# Patient Record
Sex: FEMALE | Race: WHITE
[De-identification: names, ages, dates, MRNs, and addresses within clinical notes are randomized per-mention and may not be internally consistent; named-entity substitution may affect disease eponyms.]

---

## 2020-07-29 PROBLEM — Z00.00 ENCOUNTER FOR PREVENTIVE HEALTH EXAMINATION: Status: ACTIVE | Noted: 2020-07-29

## 2020-08-05 ENCOUNTER — APPOINTMENT (OUTPATIENT)
Dept: SURGERY | Facility: CLINIC | Age: 75
End: 2020-08-05
Payer: MEDICARE

## 2020-08-05 VITALS
DIASTOLIC BLOOD PRESSURE: 72 MMHG | TEMPERATURE: 98.1 F | HEIGHT: 64 IN | WEIGHT: 160 LBS | SYSTOLIC BLOOD PRESSURE: 126 MMHG | BODY MASS INDEX: 27.31 KG/M2 | HEART RATE: 75 BPM

## 2020-08-05 DIAGNOSIS — E78.00 PURE HYPERCHOLESTEROLEMIA, UNSPECIFIED: ICD-10-CM

## 2020-08-05 DIAGNOSIS — I10 ESSENTIAL (PRIMARY) HYPERTENSION: ICD-10-CM

## 2020-08-05 DIAGNOSIS — K80.20 CALCULUS OF GALLBLADDER W/OUT CHOLECYSTITIS W/OUT OBSTRUCTION: ICD-10-CM

## 2020-08-05 PROCEDURE — 99203 OFFICE O/P NEW LOW 30 MIN: CPT

## 2020-08-05 RX ORDER — AMLODIPINE AND ATORVASTATIN 2.5; 4 MG/1; MG/1
TABLET, COATED ORAL
Refills: 0 | Status: ACTIVE | COMMUNITY

## 2020-08-05 RX ORDER — FLUOXETINE HCL 10 MG
10 TABLET ORAL
Refills: 0 | Status: ACTIVE | COMMUNITY

## 2020-08-05 RX ORDER — ATORVASTATIN CALCIUM 20 MG/1
20 TABLET, FILM COATED ORAL
Refills: 0 | Status: ACTIVE | COMMUNITY

## 2020-08-05 RX ORDER — UBIDECARENONE/VIT E ACET 100MG-5
CAPSULE ORAL
Refills: 0 | Status: ACTIVE | COMMUNITY

## 2020-08-10 NOTE — ASSESSMENT
[FreeTextEntry1] : 76yo female with cholelithiasis.\par -clinical presentation and evaluation not concerning for biliary colic\par -recommend EGD - gave referral for GI \par -discussed possible HIDA scan if symptoms persist\par -return to office PRN - recommend coming to ER if develops fever/chills, nausea/vomiting

## 2020-08-10 NOTE — PHYSICAL EXAM
[Normal Breath Sounds] : Normal breath sounds [Normal Heart Sounds] : normal heart sounds [Alert] : alert [Calm] : calm [de-identified] : no acute distress [de-identified] : full ROM x 4 [de-identified] : no CVA tenderness B/L [de-identified] : point tenderness along right lateral costal margin on rib cage, no swelling, no masses, no abdominal tenderness, RLQ scar, no rebound/guarding, no hernia

## 2020-08-10 NOTE — CONSULT LETTER
[Courtesy Letter:] : I had the pleasure of seeing your patient, [unfilled], in my office today. [Please see my note below.] : Please see my note below. [Sincerely,] : Sincerely, [Dear  ___] : Dear  [unfilled], [FreeTextEntry3] : Shalini Santoyo MD\par Bariatric & Minimally Invasive Surgery\par [facility]\par [phone]

## 2020-08-10 NOTE — DATA REVIEWED
[FreeTextEntry1] : US abdomen complete without doppler 7/10/2020 - single mobile 5mm gallstone, no evidence of acute cholecystitis

## 2020-08-10 NOTE — HISTORY OF PRESENT ILLNESS
[de-identified] : 76yo female with PMHx of HTN, HLD referred for evaluation of gallstones. Patient reports intermittent right sided pain along her ribcage x several months. She also describes increased belching recently. Occasional nausea if she is hungry, otherwise denies vomiting, fever/chills, chest pain or shortness of breath. She feels her symptoms started during the pandemic when she started eating poorly. Although, she does not think the pain is associated with eating meals, but rather feels it is "random." She underwent abdominal US that demonstrated a single gallstone. Denies urinary symptoms or issues with bowel function.

## 2021-04-29 ENCOUNTER — OUTPATIENT (OUTPATIENT)
Dept: OUTPATIENT SERVICES | Facility: HOSPITAL | Age: 76
LOS: 1 days | Discharge: HOME | End: 2021-04-29
Payer: MEDICARE

## 2021-04-29 DIAGNOSIS — Z12.31 ENCOUNTER FOR SCREENING MAMMOGRAM FOR MALIGNANT NEOPLASM OF BREAST: ICD-10-CM

## 2021-04-29 PROCEDURE — 77067 SCR MAMMO BI INCL CAD: CPT | Mod: 26

## 2021-04-29 PROCEDURE — 77063 BREAST TOMOSYNTHESIS BI: CPT | Mod: 26

## 2021-04-30 DIAGNOSIS — Z87.310 PERSONAL HISTORY OF (HEALED) OSTEOPOROSIS FRACTURE: ICD-10-CM

## 2021-04-30 DIAGNOSIS — Z78.0 ASYMPTOMATIC MENOPAUSAL STATE: ICD-10-CM

## 2021-04-30 DIAGNOSIS — M81.0 AGE-RELATED OSTEOPOROSIS WITHOUT CURRENT PATHOLOGICAL FRACTURE: ICD-10-CM

## 2021-04-30 DIAGNOSIS — Z13.820 ENCOUNTER FOR SCREENING FOR OSTEOPOROSIS: ICD-10-CM

## 2022-08-15 ENCOUNTER — APPOINTMENT (OUTPATIENT)
Dept: ORTHOPEDIC SURGERY | Facility: CLINIC | Age: 77
End: 2022-08-15

## 2022-08-15 VITALS — BODY MASS INDEX: 27.31 KG/M2 | HEIGHT: 64 IN | WEIGHT: 160 LBS

## 2022-08-15 DIAGNOSIS — S52.592A OTHER FRACTURES OF LOWER END OF LEFT RADIUS, INITIAL ENCOUNTER FOR CLOSED FRACTURE: ICD-10-CM

## 2022-08-15 PROCEDURE — 29125 APPL SHORT ARM SPLINT STATIC: CPT | Mod: LT

## 2022-08-15 PROCEDURE — 73130 X-RAY EXAM OF HAND: CPT | Mod: LT

## 2022-08-15 PROCEDURE — 73110 X-RAY EXAM OF WRIST: CPT | Mod: LT

## 2022-08-15 PROCEDURE — 99203 OFFICE O/P NEW LOW 30 MIN: CPT | Mod: 25

## 2022-08-15 RX ORDER — TRAMADOL HYDROCHLORIDE 50 MG/1
50 TABLET, COATED ORAL
Qty: 6 | Refills: 0 | Status: ACTIVE | COMMUNITY
Start: 2022-08-15 | End: 1900-01-01

## 2022-08-15 NOTE — HISTORY OF PRESENT ILLNESS
[de-identified] : A 69-year-old female here for left hand/ wrist pain.  Patient states on 08/13/2022 she tripped and fell in her bathroom and fell on outstretched left hand /wrist.  Patient states she was in pain but waited a couple days to see if pain went away.  Patient states pain has continued and patient has mild decreased range of motion.  Denies numbness and tingling.

## 2022-08-15 NOTE — PHYSICAL EXAM
[Left] : left hand [Distal Radius] : distal radius [Ulna Styloid] : ulna styloid [Anatomic Snuff Box] : anatomic snuff box [] : no instability w/varus/valgus or ant/post stressing of fingers joints [FreeTextEntry3] :   Mild ecchymosis radial aspect wrist [FreeTextEntry9] :  mild decreased range of motion secondary to pain [de-identified] :  good strength, pain with strength testing

## 2022-08-15 NOTE — HISTORY OF PRESENT ILLNESS
[de-identified] : A 69-year-old female here for left hand/ wrist pain.  Patient states on 08/13/2022 she tripped and fell in her bathroom and fell on outstretched left hand /wrist.  Patient states she was in pain but waited a couple days to see if pain went away.  Patient states pain has continued and patient has mild decreased range of motion.  Denies numbness and tingling.

## 2022-08-15 NOTE — PHYSICAL EXAM
[Left] : left hand [Distal Radius] : distal radius [Ulna Styloid] : ulna styloid [Anatomic Snuff Box] : anatomic snuff box [] : no instability w/varus/valgus or ant/post stressing of fingers joints [FreeTextEntry3] :   Mild ecchymosis radial aspect wrist [FreeTextEntry9] :  mild decreased range of motion secondary to pain [de-identified] :  good strength, pain with strength testing

## 2022-08-15 NOTE — DATA REVIEWED
[Left] : left [Wrist] : wrist [Hand] : hand [FreeTextEntry1] : X-Ray: Volarly displaced distal radius fracture.  ulnar styloid fracture.

## 2022-08-15 NOTE — DISCUSSION/SUMMARY
[de-identified] : Discussed x-rays with patient showing displaced distal radius fracture.  Discussed with patient that this fracture is unstable.  Dr. Roberts  came into the room, we discussed surgery with patient and possible risks and indications of surgery.  Patient agreed to surgery.  Tramadol 50 mg b.i.d. sent to patient's pharmacy for pain relief, discussed side effects.Advised pt to discuss w primary physician before taking.  Patient will be called by surgical booking.  Call if any questions or concerns.  Patient understands agrees with plan. Seen under supervision of Dr. Roberts.

## 2022-08-15 NOTE — DISCUSSION/SUMMARY
[de-identified] : Discussed x-rays with patient showing displaced distal radius fracture.  Discussed with patient that this fracture is unstable.  Dr. Roberts  came into the room, we discussed surgery with patient and possible risks and indications of surgery.  Patient agreed to surgery.  Tramadol 50 mg b.i.d. sent to patient's pharmacy for pain relief, discussed side effects.Advised pt to discuss w primary physician before taking.  Patient will be called by surgical booking.  Call if any questions or concerns.  Patient understands agrees with plan. Seen under supervision of Dr. Roberts.

## 2022-08-17 ENCOUNTER — OUTPATIENT (OUTPATIENT)
Dept: OUTPATIENT SERVICES | Facility: HOSPITAL | Age: 77
LOS: 1 days | Discharge: HOME | End: 2022-08-17

## 2022-08-17 ENCOUNTER — RESULT REVIEW (OUTPATIENT)
Age: 77
End: 2022-08-17

## 2022-08-17 VITALS
HEART RATE: 70 BPM | DIASTOLIC BLOOD PRESSURE: 70 MMHG | SYSTOLIC BLOOD PRESSURE: 124 MMHG | RESPIRATION RATE: 16 BRPM | WEIGHT: 158.07 LBS | HEIGHT: 64 IN | TEMPERATURE: 98 F | OXYGEN SATURATION: 98 %

## 2022-08-17 DIAGNOSIS — S52.572D OTHER INTRAARTICULAR FRACTURE OF LOWER END OF LEFT RADIUS, SUBSEQUENT ENCOUNTER FOR CLOSED FRACTURE WITH ROUTINE HEALING: ICD-10-CM

## 2022-08-17 DIAGNOSIS — Z98.890 OTHER SPECIFIED POSTPROCEDURAL STATES: Chronic | ICD-10-CM

## 2022-08-17 DIAGNOSIS — Z01.818 ENCOUNTER FOR OTHER PREPROCEDURAL EXAMINATION: ICD-10-CM

## 2022-08-17 LAB
ALBUMIN SERPL ELPH-MCNC: 4.4 G/DL — SIGNIFICANT CHANGE UP (ref 3.5–5.2)
ALP SERPL-CCNC: 120 U/L — HIGH (ref 30–115)
ALT FLD-CCNC: 13 U/L — SIGNIFICANT CHANGE UP (ref 0–41)
ANION GAP SERPL CALC-SCNC: 11 MMOL/L — SIGNIFICANT CHANGE UP (ref 7–14)
APPEARANCE UR: CLEAR — SIGNIFICANT CHANGE UP
AST SERPL-CCNC: 16 U/L — SIGNIFICANT CHANGE UP (ref 0–41)
BACTERIA # UR AUTO: NEGATIVE — SIGNIFICANT CHANGE UP
BASOPHILS # BLD AUTO: 0.03 K/UL — SIGNIFICANT CHANGE UP (ref 0–0.2)
BASOPHILS NFR BLD AUTO: 0.5 % — SIGNIFICANT CHANGE UP (ref 0–1)
BILIRUB SERPL-MCNC: 0.4 MG/DL — SIGNIFICANT CHANGE UP (ref 0.2–1.2)
BILIRUB UR-MCNC: NEGATIVE — SIGNIFICANT CHANGE UP
BUN SERPL-MCNC: 21 MG/DL — HIGH (ref 10–20)
CALCIUM SERPL-MCNC: 9.7 MG/DL — SIGNIFICANT CHANGE UP (ref 8.5–10.1)
CHLORIDE SERPL-SCNC: 102 MMOL/L — SIGNIFICANT CHANGE UP (ref 98–110)
CO2 SERPL-SCNC: 24 MMOL/L — SIGNIFICANT CHANGE UP (ref 17–32)
COLOR SPEC: YELLOW — SIGNIFICANT CHANGE UP
CREAT SERPL-MCNC: 0.7 MG/DL — SIGNIFICANT CHANGE UP (ref 0.7–1.5)
DIFF PNL FLD: ABNORMAL
EGFR: 88 ML/MIN/1.73M2 — SIGNIFICANT CHANGE UP
EOSINOPHIL # BLD AUTO: 0.25 K/UL — SIGNIFICANT CHANGE UP (ref 0–0.7)
EOSINOPHIL NFR BLD AUTO: 3.8 % — SIGNIFICANT CHANGE UP (ref 0–8)
EPI CELLS # UR: 7 /HPF — HIGH (ref 0–5)
GLUCOSE SERPL-MCNC: 77 MG/DL — SIGNIFICANT CHANGE UP (ref 70–99)
GLUCOSE UR QL: NEGATIVE — SIGNIFICANT CHANGE UP
HCT VFR BLD CALC: 42.8 % — SIGNIFICANT CHANGE UP (ref 37–47)
HGB BLD-MCNC: 14 G/DL — SIGNIFICANT CHANGE UP (ref 12–16)
HYALINE CASTS # UR AUTO: 1 /LPF — SIGNIFICANT CHANGE UP (ref 0–7)
IMM GRANULOCYTES NFR BLD AUTO: 0.3 % — SIGNIFICANT CHANGE UP (ref 0.1–0.3)
KETONES UR-MCNC: NEGATIVE — SIGNIFICANT CHANGE UP
LEUKOCYTE ESTERASE UR-ACNC: NEGATIVE — SIGNIFICANT CHANGE UP
LYMPHOCYTES # BLD AUTO: 1.03 K/UL — LOW (ref 1.2–3.4)
LYMPHOCYTES # BLD AUTO: 15.7 % — LOW (ref 20.5–51.1)
MCHC RBC-ENTMCNC: 31.7 PG — HIGH (ref 27–31)
MCHC RBC-ENTMCNC: 32.7 G/DL — SIGNIFICANT CHANGE UP (ref 32–37)
MCV RBC AUTO: 97.1 FL — SIGNIFICANT CHANGE UP (ref 81–99)
MONOCYTES # BLD AUTO: 0.59 K/UL — SIGNIFICANT CHANGE UP (ref 0.1–0.6)
MONOCYTES NFR BLD AUTO: 9 % — SIGNIFICANT CHANGE UP (ref 1.7–9.3)
NEUTROPHILS # BLD AUTO: 4.62 K/UL — SIGNIFICANT CHANGE UP (ref 1.4–6.5)
NEUTROPHILS NFR BLD AUTO: 70.7 % — SIGNIFICANT CHANGE UP (ref 42.2–75.2)
NITRITE UR-MCNC: NEGATIVE — SIGNIFICANT CHANGE UP
NRBC # BLD: 0 /100 WBCS — SIGNIFICANT CHANGE UP (ref 0–0)
PH UR: 6.5 — SIGNIFICANT CHANGE UP (ref 5–8)
PLATELET # BLD AUTO: 221 K/UL — SIGNIFICANT CHANGE UP (ref 130–400)
POTASSIUM SERPL-MCNC: 4.1 MMOL/L — SIGNIFICANT CHANGE UP (ref 3.5–5)
POTASSIUM SERPL-SCNC: 4.1 MMOL/L — SIGNIFICANT CHANGE UP (ref 3.5–5)
PROT SERPL-MCNC: 7.1 G/DL — SIGNIFICANT CHANGE UP (ref 6–8)
PROT UR-MCNC: ABNORMAL
RBC # BLD: 4.41 M/UL — SIGNIFICANT CHANGE UP (ref 4.2–5.4)
RBC # FLD: 12.7 % — SIGNIFICANT CHANGE UP (ref 11.5–14.5)
RBC CASTS # UR COMP ASSIST: 1 /HPF — SIGNIFICANT CHANGE UP (ref 0–4)
SODIUM SERPL-SCNC: 137 MMOL/L — SIGNIFICANT CHANGE UP (ref 135–146)
SP GR SPEC: 1.02 — SIGNIFICANT CHANGE UP (ref 1.01–1.03)
UROBILINOGEN FLD QL: SIGNIFICANT CHANGE UP
WBC # BLD: 6.54 K/UL — SIGNIFICANT CHANGE UP (ref 4.8–10.8)
WBC # FLD AUTO: 6.54 K/UL — SIGNIFICANT CHANGE UP (ref 4.8–10.8)
WBC UR QL: 2 /HPF — SIGNIFICANT CHANGE UP (ref 0–5)

## 2022-08-17 PROCEDURE — 93010 ELECTROCARDIOGRAM REPORT: CPT

## 2022-08-17 PROCEDURE — 71046 X-RAY EXAM CHEST 2 VIEWS: CPT | Mod: 26

## 2022-08-17 NOTE — H&P PST ADULT - NSICDXPASTMEDICALHX_GEN_ALL_CORE_FT
PAST MEDICAL HISTORY:  Depression Denies suicidal ideas    GERD (gastroesophageal reflux disease)     HTN (hypertension)     Hypercholesteremia

## 2022-08-17 NOTE — H&P PST ADULT - NSICDXPASTSURGICALHX_GEN_ALL_CORE_FT
PAST SURGICAL HISTORY:  S/P ORIF (open reduction internal fixation) fracture RT hip  hysterectomy  cataract surgery-B/L

## 2022-08-17 NOTE — H&P PST ADULT - HISTORY OF PRESENT ILLNESS
77yr old female states tripped and fell at home and fractured her LT wrist on 8/13/2022 is scheduled for OPEN REDUCTION INTERNAL FIXATION LEFT WRIST FRACTURE. Denies COVID S/S. Recd 3 doses vaccine. Verbalized understanding of COVID prevention measures. Exercise zeyad 1 flat block slowly with cane LTD by pain knees and balance issues.  Anesthesia Alert  YES--Difficult Airway  NO--History of neck surgery or radiation  NO--Limited ROM of neck  NO--History of Malignant hyperthermia  NO--Personal or family history of Pseudocholinesterase deficiency  YES--Prior Anesthesia Complication-Hypotension after colonoscopy  NO--Latex Allergy  NO--Loose teeth  NO--History of Rheumatoid Arthritis  NO--PHAM  No Bleeding risk  NO--Other_____

## 2022-08-17 NOTE — H&P PST ADULT - NSANTHOSAYNRD_GEN_A_CORE
No. PHAM screening performed.  STOP BANG Legend: 0-2 = LOW Risk; 3-4 = INTERMEDIATE Risk; 5-8 = HIGH Risk

## 2022-08-17 NOTE — H&P PST ADULT - ADDITIONAL PE
LT arm  in cast- - mild swelling of the fingers with ecchymosis of the thumb-brisk  cap refill, warm, able to move fingers , sensation intact. Gait slow with cane -needs assist with transfers.

## 2022-08-17 NOTE — H&P PST ADULT - MUSCULOSKELETAL
normal/normal gait/strength 5/5 bilateral upper extremities/strength 5/5 bilateral lower extremities negative

## 2022-08-18 ENCOUNTER — APPOINTMENT (OUTPATIENT)
Dept: ORTHOPEDIC SURGERY | Facility: CLINIC | Age: 77
End: 2022-08-18

## 2022-08-18 VITALS — WEIGHT: 158 LBS | BODY MASS INDEX: 26.98 KG/M2 | HEIGHT: 64 IN

## 2022-08-18 DIAGNOSIS — M25.571 PAIN IN RIGHT ANKLE AND JOINTS OF RIGHT FOOT: ICD-10-CM

## 2022-08-18 DIAGNOSIS — M17.11 UNILATERAL PRIMARY OSTEOARTHRITIS, RIGHT KNEE: ICD-10-CM

## 2022-08-18 DIAGNOSIS — M25.551 PAIN IN RIGHT HIP: ICD-10-CM

## 2022-08-18 PROBLEM — I10 ESSENTIAL (PRIMARY) HYPERTENSION: Chronic | Status: ACTIVE | Noted: 2022-08-17

## 2022-08-18 PROBLEM — K21.9 GASTRO-ESOPHAGEAL REFLUX DISEASE WITHOUT ESOPHAGITIS: Chronic | Status: ACTIVE | Noted: 2022-08-17

## 2022-08-18 PROBLEM — F32.A DEPRESSION, UNSPECIFIED: Chronic | Status: ACTIVE | Noted: 2022-08-17

## 2022-08-18 PROBLEM — E78.00 PURE HYPERCHOLESTEROLEMIA, UNSPECIFIED: Chronic | Status: ACTIVE | Noted: 2022-08-17

## 2022-08-18 PROCEDURE — 20610 DRAIN/INJ JOINT/BURSA W/O US: CPT | Mod: RT

## 2022-08-18 PROCEDURE — 73630 X-RAY EXAM OF FOOT: CPT | Mod: RT

## 2022-08-18 PROCEDURE — 73502 X-RAY EXAM HIP UNI 2-3 VIEWS: CPT | Mod: RT

## 2022-08-18 PROCEDURE — 73562 X-RAY EXAM OF KNEE 3: CPT | Mod: RT

## 2022-08-18 PROCEDURE — 73610 X-RAY EXAM OF ANKLE: CPT | Mod: RT

## 2022-08-18 PROCEDURE — 99203 OFFICE O/P NEW LOW 30 MIN: CPT | Mod: 25

## 2022-08-18 NOTE — PROCEDURE
[Large Joint Injection] : Large joint injection [Right] : of the right [Knee] : knee [Pain] : pain [X-ray evidence of Osteoarthritis on this or prior visit] : x-ray evidence of Osteoarthritis on this or prior visit [Alcohol] : alcohol [Betadine] : betadine [Ethyl Chloride sprayed topically] : ethyl chloride sprayed topically [Sterile technique used] : sterile technique used [___ cc    1%] : Lidocaine ~Vcc of 1%  [___ cc    4mg] : Dexamethasone (Decadron) ~Vcc of 4 mg  [] : Patient tolerated procedure well

## 2022-08-21 ENCOUNTER — LABORATORY RESULT (OUTPATIENT)
Age: 77
End: 2022-08-21

## 2022-08-23 NOTE — ASU PATIENT PROFILE, ADULT - FALL HARM RISK - HARM RISK INTERVENTIONS

## 2022-08-24 ENCOUNTER — TRANSCRIPTION ENCOUNTER (OUTPATIENT)
Age: 77
End: 2022-08-24

## 2022-08-24 ENCOUNTER — APPOINTMENT (OUTPATIENT)
Age: 77
End: 2022-08-24

## 2022-08-24 ENCOUNTER — OUTPATIENT (OUTPATIENT)
Dept: OUTPATIENT SERVICES | Facility: HOSPITAL | Age: 77
LOS: 1 days | Discharge: HOME | End: 2022-08-24

## 2022-08-24 VITALS
RESPIRATION RATE: 18 BRPM | SYSTOLIC BLOOD PRESSURE: 134 MMHG | DIASTOLIC BLOOD PRESSURE: 68 MMHG | OXYGEN SATURATION: 98 % | HEIGHT: 64 IN | HEART RATE: 70 BPM | TEMPERATURE: 99 F | WEIGHT: 158.07 LBS

## 2022-08-24 VITALS
RESPIRATION RATE: 19 BRPM | DIASTOLIC BLOOD PRESSURE: 68 MMHG | OXYGEN SATURATION: 98 % | HEART RATE: 71 BPM | SYSTOLIC BLOOD PRESSURE: 147 MMHG

## 2022-08-24 DIAGNOSIS — Z98.890 OTHER SPECIFIED POSTPROCEDURAL STATES: Chronic | ICD-10-CM

## 2022-08-24 PROCEDURE — 25609 OPTX DST RD XART FX/EP SEP3+: CPT | Mod: LT

## 2022-08-24 RX ORDER — SODIUM CHLORIDE 9 MG/ML
1000 INJECTION, SOLUTION INTRAVENOUS
Refills: 0 | Status: DISCONTINUED | OUTPATIENT
Start: 2022-08-24 | End: 2022-09-07

## 2022-08-24 RX ORDER — AMLODIPINE BESYLATE 2.5 MG/1
1 TABLET ORAL
Qty: 0 | Refills: 0 | DISCHARGE

## 2022-08-24 RX ORDER — ONDANSETRON 8 MG/1
4 TABLET, FILM COATED ORAL ONCE
Refills: 0 | Status: DISCONTINUED | OUTPATIENT
Start: 2022-08-24 | End: 2022-09-07

## 2022-08-24 RX ORDER — ATORVASTATIN CALCIUM 80 MG/1
1 TABLET, FILM COATED ORAL
Qty: 0 | Refills: 0 | DISCHARGE

## 2022-08-24 RX ORDER — TRAMADOL HYDROCHLORIDE 50 MG/1
1 TABLET ORAL
Qty: 0 | Refills: 0 | DISCHARGE

## 2022-08-24 RX ORDER — OMEPRAZOLE 10 MG/1
1 CAPSULE, DELAYED RELEASE ORAL
Qty: 0 | Refills: 0 | DISCHARGE

## 2022-08-24 RX ORDER — OXYCODONE AND ACETAMINOPHEN 5; 325 MG/1; MG/1
2 TABLET ORAL EVERY 6 HOURS
Refills: 0 | Status: DISCONTINUED | OUTPATIENT
Start: 2022-08-24 | End: 2022-08-24

## 2022-08-24 RX ORDER — FLUOXETINE HCL 10 MG
1 CAPSULE ORAL
Qty: 0 | Refills: 0 | DISCHARGE

## 2022-08-24 RX ORDER — IBUPROFEN 200 MG
1 TABLET ORAL
Qty: 20 | Refills: 0
Start: 2022-08-24

## 2022-08-24 RX ADMIN — SODIUM CHLORIDE 100 MILLILITER(S): 9 INJECTION, SOLUTION INTRAVENOUS at 16:06

## 2022-08-24 NOTE — ASU DISCHARGE PLAN (ADULT/PEDIATRIC) - ASU DC SPECIAL INSTRUCTIONSFT
DIET:    Resume normal diet  No alcoholic  beverages for 24 hours or if on prescribed narcotic pain medications.    MEDICATION:    Resume your preoperative oral medications.  Check with your physician before starting aspirin, Coumadin, or other blood thinners.  Prescriptions given to you - take as directed.      ACTIVITY:    Rest today and limit your activities for 24 hours.  Do not drive or operate machinery for 24 hours - if you received anesthesia.  When taking pain medication, be careful as you walk or climb stairs.  It is not advisable to drive while taking prescribed pain medication.    SPECIAL INSTRUCTIONS:    __x___ Elevate operative site above heart level or as directed.  __x___ Apply ice to operative site as directed.  __x___ Use  sling as directed.  __x___ Exercise fingers.    DRESSING CARE:    _____ You may change the dressing 4 days. Keep wound covered with band-aids.  __x___ Do not change the dressing until your doctor see you.  _____ You can loosen or rewrap the dressing.  _____  Keep dressing clean and dry.  _____ You may shower in _____ day(s) with the extremity covered by a plastic bag.  _____ OK to wash hand , including showers, in _____ day(s).    ADDITIONAL  INFORMATION:    Post operative visit should be scheduled for next week.  If you are not aware of visit please contact office.  If you have any questions or concerns call office at      Notify your doctor if you develop   Fever  Excessive Swelling  Chills   Drainage   Pain not controlled by medication  Persistent numbness in hand or fingers    If an Emergency arises call 911 and/or go to the Emergency Room

## 2022-08-24 NOTE — BRIEF OPERATIVE NOTE - NSICDXBRIEFPOSTOP_GEN_ALL_CORE_FT
POST-OP DIAGNOSIS:  Other intra-articular fracture of distal end of left radius 24-Aug-2022 14:21:15  Haresh Roberts

## 2022-08-24 NOTE — ASU DISCHARGE PLAN (ADULT/PEDIATRIC) - CARE PROVIDER_API CALL
Haresh Roberts)  Orthopaedic Surgery  [address] [place] [address]  Phone: [phone]  Fax: [phone]  Follow Up Time:

## 2022-08-24 NOTE — BRIEF OPERATIVE NOTE - NSICDXBRIEFPROCEDURE_GEN_ALL_CORE_FT
PROCEDURES:  ORIF, 3-part fracture, radius, distal, intra-articular 24-Aug-2022 14:20:48  Haresh Roberts

## 2022-08-24 NOTE — BRIEF OPERATIVE NOTE - NSICDXBRIEFPREOP_GEN_ALL_CORE_FT
PRE-OP DIAGNOSIS:  Other intra-articular fracture of distal end of left radius 24-Aug-2022 14:21:02  Haresh Roberts

## 2022-08-24 NOTE — CHART NOTE - NSCHARTNOTEFT_GEN_A_CORE
PACU ANESTHESIA ADMISSION NOTE      Procedure: ORIF, 3-part fracture, radius, distal, intra-articular      Post op diagnosis:  Other intra-articular fracture of distal end of left radius        ____  Intubated  TV:______       Rate: ______      FiO2: ______    __x__  Patent Airway    __x__  Full return of protective reflexes    __x__  Full recovery from anesthesia / back to baseline status    Vitals  HR: 65  BP: 140/65  RR: 18  O2 Sat: 98%  Temp: 97.6    Mental Status:  __x__ Awake   ___x__ Alert   _____ Drowsy   _____ Sedated    Nausea/Vomiting:  __x__ NO  ______Yes,   See Post - Op Orders          Pain Scale (0-10):  _____    Treatment: ____ None    __x__ See Post - Op/PCA Orders    Post - Operative Fluids:   ____ Oral   __x__ See Post - Op Orders    Plan: Discharge when criteria met:   _x___Home       _____Floor     _____Critical Care   Other:_________________    Comments: Patient had smooth intraoperative event, no anesthesia complication.

## 2022-08-24 NOTE — ASU DISCHARGE PLAN (ADULT/PEDIATRIC) - NS MD DC FALL RISK RISK
For information on Fall & Injury Prevention, visit: [email or web address] OR  [email or web address] OR  https://www.cdc.gov/steadi/patient.html
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
## 2022-08-26 DIAGNOSIS — S52.572A OTHER INTRAARTICULAR FRACTURE OF LOWER END OF LEFT RADIUS, INITIAL ENCOUNTER FOR CLOSED FRACTURE: ICD-10-CM
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
## 2022-08-31 ENCOUNTER — APPOINTMENT (OUTPATIENT)
Dept: ORTHOPEDIC SURGERY | Facility: CLINIC | Age: 77
End: 2022-08-31

## 2022-08-31 PROCEDURE — 99024 POSTOP FOLLOW-UP VISIT: CPT

## 2022-08-31 PROCEDURE — 73110 X-RAY EXAM OF WRIST: CPT | Mod: LT

## 2022-08-31 NOTE — DISCUSSION/SUMMARY
[de-identified] : Today I removed the postop splint and sutures.\par The patient will wear a custom splint that can be removed for bathing and gentle range of motion.\par She will start therapy and follow up with Dr. Roberts in 3-4 weeks for repeat x-ray and evaluation.\par All questions were answered today.\par

## 2022-08-31 NOTE — DATA REVIEWED
[FreeTextEntry1] : X-rays taken the office today of her left wrist show the fracture in anatomic alignment and all surgical hardware intact.

## 2022-08-31 NOTE — PHYSICAL EXAM
[de-identified] : Physical exam of her left wrist:  Resolving swelling and ecchymosis.  The wound is clean and dry.  No signs of drainage, pus, or infection.  Good motion of the wrist and hand with some pain.\par

## 2022-08-31 NOTE — HISTORY OF PRESENT ILLNESS
[de-identified] : Patient is a 77-year-old female here for her 1st postop appointment.  She is status post an ORIF of the left wrist done by Dr. Roberts.  She is doing well.

## 2022-09-07 NOTE — HISTORY OF PRESENT ILLNESS
[de-identified] :   Patient is a 77-year-old female accompanied by her family, for an evaluation of injury sustained after a fall on August 13, 2022.  \par Patient  was evaluated the day of the injury and she was advised of a fracture to the left wrist, patient is scheduled to have surgery for her wrist, after the acute pain on her wrist that all she noticed that she is having pain on her right foot, right ankle, right knee and right hip.\par Patient has a history of a ORIF to the right hip.\par Patient is using a cane for ambulation at this time.  \par Patient denies any significant pain to her hip, knee or foot prior to the fall.\par \par As per son, patient is been having some difficulty and some  weakness on her lower extremities prior to the fall on August 13, 2022.

## 2022-09-07 NOTE — IMAGING
[de-identified] :  RIGHT HIP.\par    on examination of the right hip, patient has good range of motion to the  hip with minimal end range pain about the groin.  \par Patient can forward flex to about 65° to the right hip as well as to the left hip.  \par No significant end range pain.  \par Patient has decreased rotation to the hip to internal and external rotation with minimal end range pain.  \par Patient has equal range of motion to internal and external rotation to the right and left hip.  \par No groin pain.  \par Negative log-rolling.  \par Some discomfort on about the lateral aspect of the mid thigh.  \par Good motor strength to hip flexion.\par \par \par  RIGHT HIP AND PELVIS X-RAYS:For any acute fracture or distal occasion, there is hardware seen on the hip, hardware is in good position, no lucency of the hardware.  Mild degenerative changes over the  hip joint.\par \par RIGHT KNEE\par  on examination of the right knee, patient has mild generalized swelling, no ecchymosis, no erythema.  \par Patient has discomfort with flexion-extension of the knee.  \par Patient has tenderness to the medial and lateral joint line, the point of maximum tenderness on the anterior aspect of the medial joint line.  \par Equivocal Nguyễn's.  \par Nontender the patella tendon or the quadriceps tendon.  \par Negative patellar grind, negative patellar apprehension.  \par No signs of instability to valgus and varus stress or to anterior drawer.  \par Good motor strength to knee flexion extension.\par \par \par RIGHT KNEE X-RAYS   Negative for any acute fracture or dislocation.  There is some joint narrowing space over the medial compartment as well as the patellofemoral compartment, mild joint effusion.\par \par \par   RIGHT FOOT AND ANKLE.\par   On examination of the right foot and ankle, patient has minimal swelling on about the lateral ligaments.  \par No  swelling over the dorsal aspect of the foot, there is a bunion observed.  \par Patient has minimal discomfort over the anterior talofibular ligament, some discomfort on about the  dorsal aspect of the foot.  \par Nontender the Lisfranc.  \par Nontender the base of the 5th metatarsal.  \par Good range of motion to the ankle.  \par Negative anterior drawer.  \par Calf is soft, nontender.  \par Neurovascular intact.\par \par \par RIGHT FOOT X-RAYS : Negative for any acute fracture or dislocation, hallux valgus observed.\par \par RIGHT ANKLE X-RAYS:  Negative for any acute fracture dislocation\par \par \par   On examination of the lumbar spine, patient has tenderness to palpation about the lumbar vertebrae raise and over the paraspinal muscles.  \par Patient has tender to palpation about the sciatica notch.\par Negative straight leg raise.\par

## 2022-09-07 NOTE — DISCUSSION/SUMMARY
[de-identified] :  at this time impression is contusion to the right foot and mild sprain over the right ankle.  \par Patient was advised to use an ankle support.  \par Patient was advised to do weight-bearing as tolerated.  \par With regards to the right knee I believe there is aggravated osteoarthritis of the knee and contusion, patient was over a cortisone injection, patient agrees.  \par Patient tolerated injection well.  \par Good regards to the hip I believe the pain that she feels on her thigh and radiating all the way down to her foot may be due to mild lumbar  At the.  \par Patient was advised to be evaluated by pain management.\par \par Supervising physician Dr. Martin.\par

## 2022-09-21 ENCOUNTER — APPOINTMENT (OUTPATIENT)
Dept: ORTHOPEDIC SURGERY | Facility: CLINIC | Age: 77
End: 2022-09-21

## 2022-09-21 PROCEDURE — 99024 POSTOP FOLLOW-UP VISIT: CPT

## 2022-09-21 NOTE — DISCUSSION/SUMMARY
[de-identified] :   She is about 4 weeks status post surgery.\par She is feeling well and moving well.\par She may start to wean herself out of the brace and reintroduce her wrist to normal life.\par She will continue to do home exercises on her own.\par She will continue to avoid any pushing, pulling, or heavy lifting for about another 2 weeks.\par She will follow up in the office on as-needed basis.  All her questions were answered today.

## 2022-09-21 NOTE — DATA REVIEWED
[FreeTextEntry1] :   X-rays repeated in the office today for left wrist show a healing distal radius fracture with all surgical hardware intact.

## 2022-09-21 NOTE — PHYSICAL EXAM
[de-identified] :   Physical exam of her left wrist:  Resolving swelling.  Negative ecchymosis.  There is a well-healed surgical scar.  He has good motion of the wrist and hand.   strength is 5/5.  Sensory and motor are intact.

## 2022-09-21 NOTE — HISTORY OF PRESENT ILLNESS
[de-identified] :   Patient is a 77-year-old female here for repeat evaluation of her left wrist.  She is a month status post an ORIF of her distal radius.  She is doing really well.  She is accompanied by her daughter.  She has been doing therapy on her own.  She does not have any complaints today.

## 2023-02-25 ENCOUNTER — APPOINTMENT (OUTPATIENT)
Dept: ORTHOPEDIC SURGERY | Facility: CLINIC | Age: 78
End: 2023-02-25
Payer: MEDICARE

## 2023-02-25 VITALS — BODY MASS INDEX: 28 KG/M2 | WEIGHT: 164 LBS | HEIGHT: 64 IN

## 2023-02-25 PROCEDURE — 73140 X-RAY EXAM OF FINGER(S): CPT | Mod: 50

## 2023-02-25 PROCEDURE — 99214 OFFICE O/P EST MOD 30 MIN: CPT

## 2023-02-25 PROCEDURE — 73562 X-RAY EXAM OF KNEE 3: CPT | Mod: RT

## 2023-02-25 NOTE — PHYSICAL EXAM
[de-identified] : Physical exam of her bilateral hands: She is swelling and ecchymosis of her right pinky and left thumb.  She has tenderness over the proximal phalanx of the right pinky and left thumb.  She cannot make a full fist bilaterally secondary to the pain.  She is nontender over the distal radius ulna bilaterally.  Nontender over the first through fifth metacarpals bilaterally.  Negative anatomical snuffbox tenderness bilaterally.  Sensory and motor are intact bilaterally.\par \par Physical exam of her right knee: Mild swelling.  She has a superficial abrasion over the patella.  Nontender over the patella facet.  No patellar grind.  She can actively straight raise.  She is nontender over the medial or lateral tibial plateau.  Calves are soft and nontender.  Mild tenderness over the fibular head.  Negative Nguyễn.  Stable to anterior/posterior drawer, valgus/varus stress testing.  Strength in her lower extremities are 5 out of 5.  Sensory and motor are intact.

## 2023-02-25 NOTE — DISCUSSION/SUMMARY
[de-identified] : I explained the x-ray findings with the patient and her daughter.\par Today I placed her in a well fitted and molded dorsal blocking fiberglass cast from her hand to lower forearm.\par Post cast x-rays show the fifth proximal phalanx fracture in better anatomical alignment.\par I placed the patient in an AlumaFoam splint on her left thumb.\par She will ice, rest, and elevate her knee.\par She will follow-up in 3 weeks for cast off, repeat x-ray and evaluation of her right pinky and left thumb.\par All questions were answered today.  She will contact the office with any questions or concerns.\par

## 2023-02-25 NOTE — HISTORY OF PRESENT ILLNESS
[de-identified] : Patient is a 77-year-old female here for evaluation of her right pinky, left thumb, and right knee.  She is accompanied by her daughter.  She states that on 2/19/2023, she went to take the garbage out when she tripped and injured these body parts.  She has not had any x-rays prior to today's visit.

## 2023-02-25 NOTE — DATA REVIEWED
[FreeTextEntry1] : X-rays taken in the office today of her right pinky show an apex volar displaced proximal phalanx fracture\par \par X-rays taken in the office today of her left thumb show an intraarticular distal phalanx fracture.\par \par X-rays taken the office today of her right knee show mild degenerative changes without any acute fractures or bony abnormalities.

## 2023-03-15 ENCOUNTER — RESULT CHARGE (OUTPATIENT)
Age: 78
End: 2023-03-15

## 2023-03-15 ENCOUNTER — APPOINTMENT (OUTPATIENT)
Dept: ORTHOPEDIC SURGERY | Facility: CLINIC | Age: 78
End: 2023-03-15
Payer: MEDICARE

## 2023-03-15 VITALS — HEIGHT: 64 IN | BODY MASS INDEX: 28 KG/M2 | WEIGHT: 164 LBS

## 2023-03-15 PROCEDURE — 99213 OFFICE O/P EST LOW 20 MIN: CPT

## 2023-03-15 PROCEDURE — 73140 X-RAY EXAM OF FINGER(S): CPT | Mod: LT

## 2023-03-15 NOTE — DATA REVIEWED
[FreeTextEntry1] : She was taken the office today for right pinky finger show healing displaced proximal phalanx base fracture.\par \par X-rays taken in the office today for left thumb show a healing distal phalanx base fracture.

## 2023-03-15 NOTE — DISCUSSION/SUMMARY
[de-identified] : Today I removed her dorsal blocking cast.\par I coral taped her 4th and 5th digits together.\par She will work on gentle range of motion of the hand.\par I also discontinued the splint from her left thumb.\par She will continue to avoid any pushing, pulling, or heavy lifting.\par The patient understands that fractures take about 6 weeks to heal.  Random residual pain can occur for 6 months to a year.\par Follow up in 3-4 weeks for repeat x-ray and evaluation.\par All questions were answered today.\par

## 2023-03-15 NOTE — PHYSICAL EXAM
[de-identified] : Physical exam of her right pinky finger: Skin is intact after cast removal.  Resolving swelling.  Negative ecchymosis.  Minimally tender over the fracture site.  She cannot make full for secondary to some stiffness.  She can flex and extend at the MCP, PIP, and DIP joint of all 10 digits.  Sensory and motor are intact.\par \par Physical exam of her left thumb: Resolving swelling.  Negative ecchymosis.  Minimally tender over the distal phalanx.  She has some stiffness with range of motion secondary to the splint.  She can flex and extend at the MCP and IP joint.  Sensory and motor are intact.

## 2023-03-15 NOTE — HISTORY OF PRESENT ILLNESS
[de-identified] : Pt is a 77 year old female here for repeat evaluation of her right pinky finger. She is status post a 5th proximal phalanx fracture and left thumb distal phalanx. She is feeling well. Today I removed her dorsal blocking cast from her right hand.

## 2023-04-04 ENCOUNTER — APPOINTMENT (OUTPATIENT)
Dept: ORTHOPEDIC SURGERY | Facility: CLINIC | Age: 78
End: 2023-04-04
Payer: MEDICARE

## 2023-04-04 VITALS — WEIGHT: 164 LBS | HEIGHT: 64 IN | BODY MASS INDEX: 28 KG/M2

## 2023-04-04 DIAGNOSIS — S52.572A OTHER INTRAARTICULAR FRACTURE OF LOWER END OF LEFT RADIUS, INITIAL ENCOUNTER FOR CLOSED FRACTURE: ICD-10-CM

## 2023-04-04 DIAGNOSIS — S62.616A DISPLACED FRACTURE OF PROXIMAL PHALANX OF RIGHT LITTLE FINGER, INITIAL ENCOUNTER FOR CLOSED FRACTURE: ICD-10-CM

## 2023-04-04 PROCEDURE — 99213 OFFICE O/P EST LOW 20 MIN: CPT

## 2023-04-04 PROCEDURE — 73140 X-RAY EXAM OF FINGER(S): CPT | Mod: RT

## 2023-04-04 NOTE — DISCUSSION/SUMMARY
[de-identified] : She is about 6 weeks status post her injuries.\par She is feeling well and moving well.  She may resume normal activities as tolerated.\par She understands she may get random residual pain and swelling for 6 months to a year.\par She will follow-up as needed.  All questions were answered today

## 2023-04-04 NOTE — PHYSICAL EXAM
[de-identified] : Physical exam of her right pinky finger: No swelling.  Negative ecchymosis.  Nontender over the fracture site.  She can make full fist.  Sensory and motor are intact.\par \par Physical exam of her left thumb: Resolving swelling.  Negative ecchymosis.  Nontender over the distal phalanx. She can flex and extend at the MCP and IP joint.  Sensory and motor are intact.

## 2023-04-04 NOTE — HISTORY OF PRESENT ILLNESS
[de-identified] : Pt is a 77 year old female here for repeat evaluation of her right pinky finger. She is status post a 5th proximal phalanx fracture and left thumb distal phalanx. She is feeling well.

## 2023-06-26 ENCOUNTER — OUTPATIENT (OUTPATIENT)
Dept: OUTPATIENT SERVICES | Facility: HOSPITAL | Age: 78
LOS: 1 days | End: 2023-06-26
Payer: MEDICARE

## 2023-06-26 DIAGNOSIS — Z12.31 ENCOUNTER FOR SCREENING MAMMOGRAM FOR MALIGNANT NEOPLASM OF BREAST: ICD-10-CM

## 2023-06-26 DIAGNOSIS — Z98.890 OTHER SPECIFIED POSTPROCEDURAL STATES: Chronic | ICD-10-CM

## 2023-06-26 PROCEDURE — 77063 BREAST TOMOSYNTHESIS BI: CPT

## 2023-06-26 PROCEDURE — 77067 SCR MAMMO BI INCL CAD: CPT

## 2023-06-26 PROCEDURE — 77063 BREAST TOMOSYNTHESIS BI: CPT | Mod: 26

## 2023-06-26 PROCEDURE — 77067 SCR MAMMO BI INCL CAD: CPT | Mod: 26

## 2023-06-27 DIAGNOSIS — Z12.31 ENCOUNTER FOR SCREENING MAMMOGRAM FOR MALIGNANT NEOPLASM OF BREAST: ICD-10-CM
